# Patient Record
Sex: MALE | Race: WHITE | URBAN - METROPOLITAN AREA
[De-identification: names, ages, dates, MRNs, and addresses within clinical notes are randomized per-mention and may not be internally consistent; named-entity substitution may affect disease eponyms.]

---

## 2017-05-23 ENCOUNTER — HOSPITAL ENCOUNTER (OUTPATIENT)
Dept: LAB | Age: 67
Discharge: HOME | End: 2017-05-23

## 2017-05-23 PROCEDURE — 88305 TISSUE EXAM BY PATHOLOGIST: CPT | Performed by: INTERNAL MEDICINE

## 2022-05-18 ENCOUNTER — PREP FOR PROCEDURE (OUTPATIENT)
Dept: ORTHOPEDIC SURGERY | Age: 72
End: 2022-05-18

## 2022-05-24 ENCOUNTER — HOSPITAL ENCOUNTER (OUTPATIENT)
Dept: PREADMISSION TESTING | Age: 72
Discharge: HOME | End: 2022-05-27

## 2022-05-24 VITALS
DIASTOLIC BLOOD PRESSURE: 88 MMHG | BODY MASS INDEX: 27.44 KG/M2 | OXYGEN SATURATION: 94 % | SYSTOLIC BLOOD PRESSURE: 142 MMHG | WEIGHT: 196 LBS | TEMPERATURE: 97.5 F | RESPIRATION RATE: 17 BRPM | HEIGHT: 71 IN | HEART RATE: 83 BPM

## 2022-05-24 RX ORDER — IBUPROFEN 200 MG
200 TABLET ORAL EVERY 6 HOURS PRN
Status: ON HOLD | COMMUNITY
End: 2022-10-11 | Stop reason: HOSPADM

## 2022-05-24 RX ORDER — ASPIRIN 81 MG/1
81 TABLET ORAL DAILY
COMMUNITY

## 2022-05-24 RX ORDER — ROSUVASTATIN CALCIUM 20 MG/1
10 TABLET, COATED ORAL DAILY
COMMUNITY
Start: 2022-01-20

## 2022-05-24 RX ORDER — MAGNESIUM 200 MG
TABLET ORAL DAILY
COMMUNITY
End: 2022-10-10

## 2022-05-24 RX ORDER — MUPIROCIN CALCIUM 20 MG/G
CREAM TOPICAL 2 TIMES DAILY
Status: DISCONTINUED | OUTPATIENT
Start: 2022-05-24 | End: 2022-05-28 | Stop reason: HOSPADM

## 2022-05-24 ASSESSMENT — PAIN SCALES - GENERAL: PAINLEVEL_OUTOF10: 0

## 2022-05-24 NOTE — PRE-PROCEDURE INSTRUCTIONS
Patient verified name, , and surgery as listed in [facility]. Patient provided medical/health information and PTA medications to the best of their ability. TYPE  CASE: IB  Orders per surgeon: were received  Labs per surgeon: MRSA, lab unable to preform test.   Labs per anesthesia protocol: none. EKG:  None per protocol. Prescription for Mupirocin ointment 22g tube, apply intranasally to both nostrils BID x5 days pre-operatively or for a total of 10 doses; called to [facility]. Patient provided with and instructed on education handouts including Guide to Surgery, blood transfusions, pain management, and hand hygiene for the family and community, and [facility] brochure. Road to Recovery Spine surgery patient guide given. Instructed on incentive spirometry with return demonstration. Patient viewed spine prehab video. Hibiclens and instructions given per hospital policy. Original medication prescription bottles were not visualized during patient appointment. Patient teach back successful and patient demonstrates knowledge of instruction.

## 2022-05-24 NOTE — PRE-PROCEDURE INSTRUCTIONS
PLEASE CONTINUE TAKING ALL PRESCRIPTION MEDICATIONS UP TO THE DAY OF SURGERY UNLESS OTHERWISE DIRECTED BELOW. DISCONTINUE all vitamins and supplements 7 days prior to surgery. DISCONTINUE Non-Steriodal Anti-Inflammatory (NSAIDS) such as Advil and Aleve 5 days prior to surgery. Home Medications to take  the day of surgery    Rosuvastatin           Home Medications   to Hold              Comments   Stop your Aspirin 81 mg on May 26, 5 days prior to your surgery    On the day before surgery please take Acetaminophen 1000mg in the morning and then again before bed. You may substitute for Tylenol 650 mg. Mupirocin 2% ointment    Prevention of Infection after surgery is a goal of your care at [address] [place] [address]. Infection prevention is a team effort between you, your doctor, and the staff at [address] [place] [address]. We can help prevent infection after surgery by good hygiene, hand washing and use of mupirocin ointment. You will need to use Mupirocin ointment in your nose twice daily for five days, please carefully read the instructions below and follow ALL of them exactly. How to use Mupirocin ointment:   This medicine has been approved for use in the nose. You do not need to use it anywhere else.  Do not get any of it in your eyes or on your skin. If it does get on these areas, rinse it off right away.  Before using the medicine, gently blow your nose to clear the nostrils. Apply inside each nostril with a cotton tipped applicator.  This medicine is not for long term use.  Make sure your doctor knows if you are pregnant or breast feeding.  This medicine should not be used in children under 15years old, unless prescribed by your doctor. Mupirocin 2% ointment: Apply to each nostril, twice daily: once in the morning and once in the evening, for five days. If you have any questions, please call the Preassessment Department where you had your appointment.  Please call between the hours of 8:30 am and 4:30 pm, Monday through Friday, excluding Holidays. Leslie Fuller [phone], [address] [phone]. Please check off on the chart below each and every time that you use the ointment. All doses of the medication need to be to completed the night before your surgery date. Start date: ___5/26/2020__________     Day #     Date Morning dose Afternoon dose    One      Two      Three      Four      Five           It is important to bring this completed sheet to the hospital the day of surgery. Give it to the nurse who gets you ready for surgery.  If you forget to bring the sheet, the 10 doses will be started over   DO NOT bring the tube of Mupirocin with you. If you have not completed all 10 doses, it will be   given to you by your nurses in the hospital    Thank you! FREQUENTLY ASKED QUESTIONS:    What is Staph and MRSA? Staphylococcus aureus or Staph germs are very common. About 1 out of every 3 people (about 30%) carries Staph on their skin or in their nose. Methicillin-resistant Staphylococcus aureus or MRSA germs are a type of Staph germ that are very resistant to antibiotics. About 2 out of every 100 people (about 2%) carry MRSA on their skin or in their nose. Does this mean I have a Staph or MRSA infection in my nose? Being colonized or a carrier means you carry the germ but have no active signs or symptoms of infection. You are not sick with a MRSA or Staph infection. How did I get Staph or MRSA? Anyone can get Staph or MRSA on their body by either having contact with an infected wound or sharing personal items that have touched infected skin. People at higher risk for contacting MRSA or Staph are those in close contact with shared equipment or supplies. For example, athletes,  and school students, nursing home residents and those receiving inpatient medical care are at higher risk. Can my family or friends get Staph or MRSA from me?    The chance of family or friends getting Staph or MRSA from you is very low. Bathing frequently, frequent hand washing and not sharing personal items like towels or razors will help prevent spreading Staph or MRSA to others. Your family or friends should wash hands with soap and water or use antibacterial gel before and after visiting with you. As long as family and friends, including children, are healthyit is okay to visit with your loved ones. Will this cancel my surgery? No, being colonized with Staph or MRSA will not cancel your surgery. However, an infection can start if Staph or MRSA germs enter a break in the skin such as a surgical site. By using the Mupirocin ointment if your swab is positive, frequent hand washing and practicing good hygiene like bathing before your surgery, we can work together to help prevent infection after your surgery. Please do not bring home medications with you on the day of surgery unless otherwise directed by your nurse. If you are instructed to bring home medications, please give them to your nurse as they will be administered by the nursing staff. If you have any questions, please call [address] [place] [address] [phone] or [address] [place] [address] [phone]. A copy of this note was provided to the patient for reference.

## 2022-05-30 ENCOUNTER — ANESTHESIA EVENT (OUTPATIENT)
Dept: SURGERY | Age: 72
End: 2022-05-30
Payer: COMMERCIAL

## 2022-05-31 ENCOUNTER — ANESTHESIA (OUTPATIENT)
Dept: SURGERY | Age: 72
End: 2022-05-31
Payer: COMMERCIAL

## 2022-05-31 ENCOUNTER — HOSPITAL ENCOUNTER (OUTPATIENT)
Age: 72
Setting detail: OUTPATIENT SURGERY
Discharge: HOME | End: 2022-05-31
Attending: ORTHOPAEDIC SURGERY | Admitting: ORTHOPAEDIC SURGERY
Payer: COMMERCIAL

## 2022-05-31 ENCOUNTER — APPOINTMENT (OUTPATIENT)
Dept: GENERAL RADIOLOGY | Age: 72
End: 2022-05-31
Attending: ORTHOPAEDIC SURGERY
Payer: COMMERCIAL

## 2022-05-31 VITALS
HEIGHT: 71 IN | RESPIRATION RATE: 16 BRPM | DIASTOLIC BLOOD PRESSURE: 83 MMHG | BODY MASS INDEX: 26.94 KG/M2 | TEMPERATURE: 97.7 F | SYSTOLIC BLOOD PRESSURE: 153 MMHG | HEART RATE: 63 BPM | WEIGHT: 192.4 LBS | OXYGEN SATURATION: 97 %

## 2022-05-31 DIAGNOSIS — M48.062 NEUROGENIC CLAUDICATION DUE TO LUMBAR SPINAL STENOSIS: Primary | ICD-10-CM

## 2022-05-31 PROBLEM — M71.38 SYNOVIAL CYST OF LUMBAR FACET JOINT: Status: ACTIVE | Noted: 2022-05-31

## 2022-05-31 PROCEDURE — 3700000000 HC ANESTHESIA ATTENDED CARE: Performed by: ORTHOPAEDIC SURGERY

## 2022-05-31 PROCEDURE — 2709999900 HC NON-CHARGEABLE SUPPLY: Performed by: ORTHOPAEDIC SURGERY

## 2022-05-31 PROCEDURE — 63047 LAM FACETEC & FORAMOT LUMBAR: CPT | Performed by: ORTHOPAEDIC SURGERY

## 2022-05-31 PROCEDURE — 6360000002 HC RX W HCPCS: Performed by: NURSE ANESTHETIST, CERTIFIED REGISTERED

## 2022-05-31 PROCEDURE — 2720000010 HC SURG SUPPLY STERILE: Performed by: ORTHOPAEDIC SURGERY

## 2022-05-31 PROCEDURE — 6370000000 HC RX 637 (ALT 250 FOR IP): Performed by: ANESTHESIOLOGY

## 2022-05-31 PROCEDURE — 3700000001 HC ADD 15 MINUTES (ANESTHESIA): Performed by: ORTHOPAEDIC SURGERY

## 2022-05-31 PROCEDURE — 6360000002 HC RX W HCPCS: Performed by: ORTHOPAEDIC SURGERY

## 2022-05-31 PROCEDURE — 3600000014 HC SURGERY LEVEL 4 ADDTL 15MIN: Performed by: ORTHOPAEDIC SURGERY

## 2022-05-31 PROCEDURE — 2580000003 HC RX 258: Performed by: ANESTHESIOLOGY

## 2022-05-31 PROCEDURE — 7100000001 HC PACU RECOVERY - ADDTL 15 MIN: Performed by: ORTHOPAEDIC SURGERY

## 2022-05-31 PROCEDURE — A4217 STERILE WATER/SALINE, 500 ML: HCPCS | Performed by: ORTHOPAEDIC SURGERY

## 2022-05-31 PROCEDURE — 6370000000 HC RX 637 (ALT 250 FOR IP): Performed by: ORTHOPAEDIC SURGERY

## 2022-05-31 PROCEDURE — 7100000010 HC PHASE II RECOVERY - FIRST 15 MIN: Performed by: ORTHOPAEDIC SURGERY

## 2022-05-31 PROCEDURE — 2500000003 HC RX 250 WO HCPCS: Performed by: NURSE ANESTHETIST, CERTIFIED REGISTERED

## 2022-05-31 PROCEDURE — 2500000003 HC RX 250 WO HCPCS

## 2022-05-31 PROCEDURE — 2580000003 HC RX 258: Performed by: ORTHOPAEDIC SURGERY

## 2022-05-31 PROCEDURE — 7100000000 HC PACU RECOVERY - FIRST 15 MIN: Performed by: ORTHOPAEDIC SURGERY

## 2022-05-31 PROCEDURE — 7100000011 HC PHASE II RECOVERY - ADDTL 15 MIN: Performed by: ORTHOPAEDIC SURGERY

## 2022-05-31 PROCEDURE — 3600000004 HC SURGERY LEVEL 4 BASE: Performed by: ORTHOPAEDIC SURGERY

## 2022-05-31 PROCEDURE — 2500000003 HC RX 250 WO HCPCS: Performed by: ORTHOPAEDIC SURGERY

## 2022-05-31 PROCEDURE — 63048 LAM FACETEC &FORAMOT EA ADDL: CPT | Performed by: ORTHOPAEDIC SURGERY

## 2022-05-31 RX ORDER — MIDAZOLAM HYDROCHLORIDE 2 MG/2ML
2 INJECTION, SOLUTION INTRAMUSCULAR; INTRAVENOUS
Status: DISCONTINUED | OUTPATIENT
Start: 2022-05-31 | End: 2022-05-31 | Stop reason: HOSPADM

## 2022-05-31 RX ORDER — BUPIVACAINE HYDROCHLORIDE AND EPINEPHRINE 5; 5 MG/ML; UG/ML
INJECTION, SOLUTION EPIDURAL; INTRACAUDAL; PERINEURAL PRN
Status: DISCONTINUED | OUTPATIENT
Start: 2022-05-31 | End: 2022-05-31 | Stop reason: ALTCHOICE

## 2022-05-31 RX ORDER — PROPOFOL 10 MG/ML
INJECTION, EMULSION INTRAVENOUS PRN
Status: DISCONTINUED | OUTPATIENT
Start: 2022-05-31 | End: 2022-05-31 | Stop reason: SDUPTHER

## 2022-05-31 RX ORDER — GLYCOPYRROLATE 0.2 MG/ML
INJECTION INTRAMUSCULAR; INTRAVENOUS PRN
Status: DISCONTINUED | OUTPATIENT
Start: 2022-05-31 | End: 2022-05-31 | Stop reason: SDUPTHER

## 2022-05-31 RX ORDER — DIPHENHYDRAMINE HYDROCHLORIDE 50 MG/ML
12.5 INJECTION INTRAMUSCULAR; INTRAVENOUS
Status: DISCONTINUED | OUTPATIENT
Start: 2022-05-31 | End: 2022-05-31 | Stop reason: HOSPADM

## 2022-05-31 RX ORDER — PROCHLORPERAZINE EDISYLATE 5 MG/ML
5 INJECTION INTRAMUSCULAR; INTRAVENOUS
Status: DISCONTINUED | OUTPATIENT
Start: 2022-05-31 | End: 2022-05-31 | Stop reason: HOSPADM

## 2022-05-31 RX ORDER — FENTANYL CITRATE 50 UG/ML
INJECTION, SOLUTION INTRAMUSCULAR; INTRAVENOUS PRN
Status: DISCONTINUED | OUTPATIENT
Start: 2022-05-31 | End: 2022-05-31 | Stop reason: SDUPTHER

## 2022-05-31 RX ORDER — ROCURONIUM BROMIDE 10 MG/ML
INJECTION, SOLUTION INTRAVENOUS PRN
Status: DISCONTINUED | OUTPATIENT
Start: 2022-05-31 | End: 2022-05-31 | Stop reason: SDUPTHER

## 2022-05-31 RX ORDER — SODIUM CHLORIDE, SODIUM LACTATE, POTASSIUM CHLORIDE, CALCIUM CHLORIDE 600; 310; 30; 20 MG/100ML; MG/100ML; MG/100ML; MG/100ML
INJECTION, SOLUTION INTRAVENOUS CONTINUOUS
Status: DISCONTINUED | OUTPATIENT
Start: 2022-05-31 | End: 2022-05-31 | Stop reason: HOSPADM

## 2022-05-31 RX ORDER — DEXAMETHASONE SODIUM PHOSPHATE 4 MG/ML
INJECTION, SOLUTION INTRA-ARTICULAR; INTRALESIONAL; INTRAMUSCULAR; INTRAVENOUS; SOFT TISSUE PRN
Status: DISCONTINUED | OUTPATIENT
Start: 2022-05-31 | End: 2022-05-31 | Stop reason: SDUPTHER

## 2022-05-31 RX ORDER — HYDROMORPHONE HYDROCHLORIDE 2 MG/ML
0.25 INJECTION, SOLUTION INTRAMUSCULAR; INTRAVENOUS; SUBCUTANEOUS EVERY 5 MIN PRN
Status: DISCONTINUED | OUTPATIENT
Start: 2022-05-31 | End: 2022-05-31 | Stop reason: HOSPADM

## 2022-05-31 RX ORDER — NEOSTIGMINE METHYLSULFATE 1 MG/ML
INJECTION, SOLUTION INTRAVENOUS PRN
Status: DISCONTINUED | OUTPATIENT
Start: 2022-05-31 | End: 2022-05-31 | Stop reason: SDUPTHER

## 2022-05-31 RX ORDER — ACETAMINOPHEN 500 MG
1000 TABLET ORAL ONCE
Status: COMPLETED | OUTPATIENT
Start: 2022-05-31 | End: 2022-05-31

## 2022-05-31 RX ORDER — ONDANSETRON 2 MG/ML
INJECTION INTRAMUSCULAR; INTRAVENOUS PRN
Status: DISCONTINUED | OUTPATIENT
Start: 2022-05-31 | End: 2022-05-31 | Stop reason: SDUPTHER

## 2022-05-31 RX ORDER — ONDANSETRON 2 MG/ML
4 INJECTION INTRAMUSCULAR; INTRAVENOUS
Status: DISCONTINUED | OUTPATIENT
Start: 2022-05-31 | End: 2022-05-31 | Stop reason: HOSPADM

## 2022-05-31 RX ORDER — OXYCODONE HYDROCHLORIDE 5 MG/1
5 TABLET ORAL EVERY 6 HOURS PRN
Qty: 20 TABLET | Refills: 0 | Status: SHIPPED | OUTPATIENT
Start: 2022-05-31 | End: 2022-06-05

## 2022-05-31 RX ORDER — LIDOCAINE HYDROCHLORIDE 20 MG/ML
INJECTION, SOLUTION EPIDURAL; INFILTRATION; INTRACAUDAL; PERINEURAL PRN
Status: DISCONTINUED | OUTPATIENT
Start: 2022-05-31 | End: 2022-05-31 | Stop reason: SDUPTHER

## 2022-05-31 RX ORDER — HYDROMORPHONE HYDROCHLORIDE 2 MG/ML
0.5 INJECTION, SOLUTION INTRAMUSCULAR; INTRAVENOUS; SUBCUTANEOUS EVERY 5 MIN PRN
Status: DISCONTINUED | OUTPATIENT
Start: 2022-05-31 | End: 2022-05-31 | Stop reason: HOSPADM

## 2022-05-31 RX ADMIN — GLYCOPYRROLATE 0.4 MG: 0.2 INJECTION INTRAMUSCULAR; INTRAVENOUS at 09:05

## 2022-05-31 RX ADMIN — SODIUM CHLORIDE, SODIUM LACTATE, POTASSIUM CHLORIDE, AND CALCIUM CHLORIDE: 600; 310; 30; 20 INJECTION, SOLUTION INTRAVENOUS at 06:49

## 2022-05-31 RX ADMIN — ACETAMINOPHEN 1000 MG: 500 TABLET ORAL at 06:39

## 2022-05-31 RX ADMIN — PROPOFOL 200 MG: 10 INJECTION, EMULSION INTRAVENOUS at 08:18

## 2022-05-31 RX ADMIN — ONDANSETRON 4 MG: 2 INJECTION INTRAMUSCULAR; INTRAVENOUS at 08:33

## 2022-05-31 RX ADMIN — Medication 3 MG: at 09:05

## 2022-05-31 RX ADMIN — DEXAMETHASONE SODIUM PHOSPHATE 4 MG: 4 INJECTION, SOLUTION INTRAMUSCULAR; INTRAVENOUS at 08:33

## 2022-05-31 RX ADMIN — FENTANYL CITRATE 50 MCG: 50 INJECTION INTRAMUSCULAR; INTRAVENOUS at 08:14

## 2022-05-31 RX ADMIN — Medication 2000 MG: at 08:26

## 2022-05-31 RX ADMIN — LIDOCAINE HYDROCHLORIDE 100 MG: 20 INJECTION, SOLUTION EPIDURAL; INFILTRATION; INTRACAUDAL; PERINEURAL at 08:18

## 2022-05-31 RX ADMIN — ROCURONIUM BROMIDE 40 MG: 50 INJECTION, SOLUTION INTRAVENOUS at 08:18

## 2022-05-31 RX ADMIN — Medication 3 AMPULE: at 06:38

## 2022-05-31 ASSESSMENT — PAIN - FUNCTIONAL ASSESSMENT
PAIN_FUNCTIONAL_ASSESSMENT: 0-10
PAIN_FUNCTIONAL_ASSESSMENT: 0-10
PAIN_FUNCTIONAL_ASSESSMENT: ACTIVITIES ARE NOT PREVENTED

## 2022-05-31 ASSESSMENT — PAIN SCALES - GENERAL: PAINLEVEL_OUTOF10: 0

## 2022-05-31 ASSESSMENT — PAIN DESCRIPTION - DESCRIPTORS: DESCRIPTORS: DISCOMFORT

## 2022-05-31 NOTE — ANESTHESIA PRE PROCEDURE
Department of Anesthesiology  Preprocedure Note       Name:  Ze Fuchs   Age:  73 y.o.  :  1950                                          MRN:  [id number]         Date:  2022      Surgeon: Selena Wood):  Evelyne Reynaga MD    Procedure: Procedure(s):  LEFT L4 L5 JOAQUIN LAMINECTOMY    Medications prior to admission:   Prior to Admission medications    Medication Sig Start Date End Date Taking? Authorizing Provider   rosuvastatin (CRESTOR) 20 MG tablet Take 10 mg by mouth daily  22   Historical Provider, MD   aspirin 81 MG EC tablet Take 81 mg by mouth daily    Historical Provider, MD   magnesium 200 MG TABS tablet Take by mouth daily     Historical Provider, MD   ibuprofen (ADVIL;MOTRIN) 200 MG tablet Take 200 mg by mouth every 6 hours as needed for Pain  Patient not taking: Reported on 2022    Historical Provider, MD       Current medications:    Current Facility-Administered Medications   Medication Dose Route Frequency Provider Last Rate Last Admin    lidocaine 1 % injection 1 mL  1 mL IntraDERmal Once PRN Adrienne Braden IV, MD        midazolam PF (VERSED) injection 2 mg  2 mg IntraVENous Once PRN Adrienne Braden IV, MD        ceFAZolin (ANCEF) 2000 mg in sterile water 20 mL IV syringe  2,000 mg IntraVENous On Call to Dian Rodarte MD        lactated ringers infusion   IntraVENous Continuous Adrienne Braden IV,  mL/hr at 22 0649 New Bag at 22 0302       Allergies: Allergies   Allergen Reactions    Other Itching     \"opiods\"       Problem List:  There is no problem list on file for this patient.       Past Medical History:        Diagnosis Date    Arthritis     Hyperlipidemia     Osteoarthritis        Past Surgical History:        Procedure Laterality Date    COLONOSCOPY      ELBOW ARTHROSCOPY Right     FINGER SURGERY Left     thumb    SHOULDER ARTHROSCOPY Left     UVULECTOMY         Social History:    Social History     Tobacco Use caps      Pulmonary: breath sounds clear to auscultation                            ROS comment: H/o UPPP, denies apnea now   Cardiovascular:  Exercise tolerance: Limited by lower back pain but still walks          Rhythm: regular  Rate: normal                    Neuro/Psych:                ROS comment: Left sided neurogenic claudication pain GI/Hepatic/Renal: Neg GI/Hepatic/Renal ROS            Endo/Other: Negative Endo/Other ROS                    Abdominal:             Vascular: negative vascular ROS. Other Findings:           Anesthesia Plan      general     ASA 2       Induction: intravenous. MIPS: Postoperative opioids intended and Prophylactic antiemetics administered. Anesthetic plan and risks discussed with patient.         Attending anesthesiologist reviewed and agrees with Preprocedure content                Jessica Gross MD   5/31/2022

## 2022-05-31 NOTE — ANESTHESIA POSTPROCEDURE EVALUATION
Department of Anesthesiology  Postprocedure Note    Patient: Ze Fuchs  MRN: [id number]  YOB: 1950  Date of evaluation: 5/31/2022  Time:  5:14 PM     Procedure Summary     Date: 05/31/22 Room / Location: [facility] MAIN OR  / [facility] MAIN OR    Anesthesia Start: 0811 Anesthesia Stop: 1378    Procedure: LEFT L4 L5 JOAQUIN LAMINECTOMY (Left Spine Lumbar) Diagnosis:       Synovial cyst of lumbar facet joint      Neurogenic claudication due to lumbar spinal stenosis      (Synovial cyst of lumbar facet joint [M71.38])      (Neurogenic claudication due to lumbar spinal stenosis [W90.507])    Providers: Evelyne Reynaga MD Responsible Provider: Debbie Perez MD    Anesthesia Type: general ASA Status: 2          Anesthesia Type: No value filed. Amy Phase I: Amy Score: 8    Amy Phase II: Amy Score: 10    Last vitals: Reviewed and per EMR flowsheets.        Anesthesia Post Evaluation    Patient location during evaluation: PACU  Patient participation: complete - patient participated  Level of consciousness: awake  Airway patency: patent  Nausea & Vomiting: no nausea and no vomiting  Complications: no  Cardiovascular status: blood pressure returned to baseline  Respiratory status: acceptable  Hydration status: euvolemic

## 2022-05-31 NOTE — OP NOTE
[address] [address]   [phone]    OPERATIVE REPORT    Patient Marce Murguia  [id number]  1950  73 y.o. DATE OF SURGERY: 5/31/2022    SURGEON: Dr. Traore Other DIAGNOSIS:left  L4 - L5 lateral recess stenosis and synovial cyst.    POSTOPERATIVE DIAGNOSIS: left L4 - L5 lateral recess stenosis and synovial cyst.    PROCEDURE:     1. left L4 and L5 hemilaminectomy including excision of synovial cyst. (CPT 21816, 08399)      ANESTHESIA: General.    ESTIMATED BLOOD LOSS: 10 cc    POSTOPERATIVE CONDITION: Stable. INTRAOPERATIVE COMPLICATIONS: None. INDICATION FOR PROCEDURE: The patient has a history of low back pain with episodic radiation to the right lower extremity consistent with neurogenic claudication primarily affecting the L5 nerve root. The patient has failed an extended period of observation and conservative measures. In the outpatient setting, the risks, benefits, and potential complications of the procedure were discussed and an informed consent was obtained. DESCRIPTION OF PROCEDURE: After adequate induction of general anesthesia, the patient was positioned prone on the [facility] spinal table. Care was taken to pad all bony prominences. Shoulders and elbows were placed in a 90-90 position. The abdomen was allowed to hang free to decrease intraabdominal pressure. The legs were flexed using the sling. The lumbar area was prepped and draped in the usual sterile fashion using a DuraPrep scrub. Preoperative antibiotics were administered. A time-out was called to confirm appropriate patient, proposed procedure, and proposed incision site. With this confirmation, an incision was created over the appropriate interspace. Dissection was carried down to the lumbodorsal fascia. The lumbodorsal fascia was released on the left side and dissection was carried down to the lamina and pars interarticularis.  A 000 curette was used to elevate the ligamentum flavum at its origin on the caudal surface of the L4  lamina and a Penfield number four was slipped just anterior to the lamina. C-arm fluoroscopy was brought in and used to obtain a cross table fluoroscopic image to confirm appropriate spinal level. With this confirmation, the Jacky Pappas number four was removed and the area was marked with electrocautery. The operating microscope was brought to the surgical field. The ligamentum flavum was then elevated off of its insertion on the cephalad surface of the  L5  lamina. A 4 mm Kerrison was used to perform a hemilaminectomy of L4 as well as L5. The ligamentum flavum was carefully elevated off of the thecal sac and excised with the Kerrison rongeur. The descending nerve root was identified and retracted medially. The lateral recess was undercut laterally to the pedicle. A foraminotomy was performed to decompress the exiting L4 and L5 nerve root. The nerve root was retracted medially again with the J Luis nerve root retractor. A cyst was noted and a excised. There was no disc herniation. The nerve was released from retraction and the area inspected and palpated with a nerve hook for adequacy of decompression. This was satisfactory. The lateral rescess was flushed with saline solution. The dorsal wound was flushed as well. The lumbodorsal fascia was approximated with a number one Vicryl suture in interrupted fashion. The skin and subcutaneous tissue were then closed in a layered fashion. Marcaine was infiltrated subcutaneously. Dermabond was applied. The patient tolerated the procedure well and was returned to the postanesthesia care unit in stable condition. At the end of the case, all sponge, needle, and instrument counts were correct. Signed: Lacie Allen.  Nahid Joseph MD

## 2022-06-02 ENCOUNTER — TELEPHONE (OUTPATIENT)
Dept: ORTHOPEDIC SURGERY | Age: 72
End: 2022-06-02

## 2022-06-02 DIAGNOSIS — Z98.890 STATUS POST LUMBAR SURGERY: Primary | ICD-10-CM

## 2022-06-02 RX ORDER — METHYLPREDNISOLONE 4 MG/1
TABLET ORAL
Qty: 1 KIT | Refills: 0 | Status: SHIPPED | OUTPATIENT
Start: 2022-06-02 | End: 2022-06-08

## 2022-06-02 NOTE — TELEPHONE ENCOUNTER
Discussed with Dr. Larry Banuelos and instructed to send a medrol dos pk to patient's pharmacy. Spoke with patient.  He is aware

## 2022-06-16 ENCOUNTER — OFFICE VISIT (OUTPATIENT)
Dept: ORTHOPEDIC SURGERY | Age: 72
End: 2022-06-16

## 2022-06-16 DIAGNOSIS — Z98.890 STATUS POST LUMBAR SURGERY: Primary | ICD-10-CM

## 2022-06-16 PROCEDURE — 99024 POSTOP FOLLOW-UP VISIT: CPT | Performed by: ORTHOPAEDIC SURGERY

## 2022-06-16 NOTE — PROGRESS NOTES
Name: Seven Mcmanus  YOB: 1950  Gender: male  MRN: [id number]  Age: 73 y.o. Chief Complaint: Lumbar spine surgery follow up    History of Present Illness:      Seven Mcmanus  is here for 2-week follow up of his  hemilaminectomy surgery. He reports some relief of preoperative lower extremity pain, weakness and parasthesias. Initially after surgery he did have significant radicular symptoms. Those subsided with a regimen of oral steroids. Now, he mostly has a tightness in his left hip. There is the expected residual back stiffness. Medications:       Current Outpatient Medications:     rosuvastatin (CRESTOR) 20 MG tablet, Take 10 mg by mouth daily , Disp: , Rfl:     aspirin 81 MG EC tablet, Take 81 mg by mouth daily, Disp: , Rfl:     magnesium 200 MG TABS tablet, Take by mouth daily , Disp: , Rfl:     ibuprofen (ADVIL;MOTRIN) 200 MG tablet, Take 200 mg by mouth every 6 hours as needed for Pain (Patient not taking: Reported on 5/31/2022), Disp: , Rfl:     Allergies: Allergies   Allergen Reactions    Other Itching     \"opiods\"         Physical Exam:      Respirations are unlabored and there is no evidence of cyanosis      Wound is healing nicely without erythema, drainage or underlying fluctuance    Gait is With a cane    Sensory testing reveals intact sensation to light touch and in the distribution of the L3-S1 dermatomes bilaterally. Strength testing in the lower extremity reveals the following based on the 5 point grading scale:       HF (L2) H Ab (L5) KE (L3/4) ADF (L4) EHL (L5) A Ev (S1) APF (S1)   Right 5 5 5 5 5 5 5   Left 5 5 5 5 5 5 5       Diagnosis:      ICD-10-CM    1. Status post lumbar surgery  Z98.890        Assessment/Plan: This patient is following the expected course following the spine surgery. I encouraged him to walk as much as possible for exercise. He can also begin a stationary bike, or other low impact aerobic exercise.  We will limit lifting to 10 pounds for the next several weeks. Prolonged sitting should be avoided to minimize the strain on the lumbar area. Thereafter, activities will be gradually increased as tolerated. I will have him return for follow up in 4 weeks or as needed if better.           Renetta Mccarthy MD    06/16/22  8:13 AM

## 2022-09-19 ENCOUNTER — TELEPHONE (OUTPATIENT)
Dept: ORTHOPEDIC SURGERY | Age: 72
End: 2022-09-19

## 2022-09-19 ENCOUNTER — OFFICE VISIT (OUTPATIENT)
Dept: ORTHOPEDIC SURGERY | Age: 72
End: 2022-09-19
Payer: COMMERCIAL

## 2022-09-19 DIAGNOSIS — M79.672 LEFT FOOT PAIN: Primary | ICD-10-CM

## 2022-09-19 DIAGNOSIS — M20.12 HALLUX VALGUS OF LEFT FOOT: ICD-10-CM

## 2022-09-19 PROCEDURE — 1123F ACP DISCUSS/DSCN MKR DOCD: CPT | Performed by: ORTHOPAEDIC SURGERY

## 2022-09-19 PROCEDURE — 99214 OFFICE O/P EST MOD 30 MIN: CPT | Performed by: ORTHOPAEDIC SURGERY

## 2022-09-19 NOTE — PROGRESS NOTES
Name: Filippo Suarez  YOB: 1950  Gender: male  MRN: [id number]    Summary:   Left hallux valgus       CC: New Patient (Left foot  x-rays taken in office today)       HPI: Filippo Suarez is a 73 y.o. male who presents with New Patient (Left foot  x-rays taken in office today)  . This patient presents to the office with a longstanding history of left bunion pain which is gotten worse. History was obtained by Patient     ROS/Meds/PSH/PMH/FH/SH: I personally reviewed the patients standard intake form. Below are the pertinents    Tobacco:  reports that he has quit smoking. He has never used smokeless tobacco.  Diabetes: None      Physical Examination:  Exam of the left foot and shows a hallux valgus deformity. There are no lesser toe deformities or metatarsalgia. There is no evidence of TMT instability. He has palpable pulses and intact sensation. Imaging:   I independently interpreted XR taken today  Left foot XR: AP, Lateral, Oblique views     ICD-10-CM    1. Left foot pain  M79.672 XR FOOT LEFT (MIN 3 VIEWS)      2. Hallux valgus of left foot  M20.12          Report: AP, lateral, oblique x-ray of the left foot demonstrates hallux valgus    Impression: Hallux valgus   Crystal Kwon III, MD           Assessment:   Left hallux valgus    Treatment Plan:   4 This is a chronic illness/condition with exacerbation and progression  Treatment at this time: Elective major surgery with procedural risk factors  Studies ordered: NO XR needed @ Next Visit    Weight-bearing status: WBAT        Return to work/work restrictions: none  No medications given      Left double level minimal invasive bunionectomy    Outpatient - 1-1/4 hours, c-arm, , Reed bur and screws    Anesthesia -  Choice       The patient understands the diagnosis of bunions and claw toes, conservative and surgical treatment.   R/C outlined including the risk of recurrence, risk of non-healing of skin and bone with/without infection,  potential loss/amputation of a toe(s) secondary to circulation loss, the risk of stiffness in the toes, and the overall risk of swelling that could be prolonged. The patient understands the use of internal and/or external k-wire type fixation and that it may take 6 months to a year before the patient can comfortably get back into regular/dress shoes. Generalized surgical risks and complications were discussed. The patient accepts and would like to proceed with surgery.

## 2022-09-20 DIAGNOSIS — M20.12 HALLUX VALGUS OF LEFT FOOT: Primary | ICD-10-CM

## 2022-10-10 ENCOUNTER — ANESTHESIA EVENT (OUTPATIENT)
Dept: SURGERY | Age: 72
End: 2022-10-10
Payer: COMMERCIAL

## 2022-10-10 NOTE — PERIOP NOTE
Patient verified name and . Order for consent  found in EHR and matches case posting; patient verifies procedure. Type 1B surgery, phone assessment complete. Orders not received. Labs per surgeon: None  Labs per anesthesia protocol: NA    Patient answered medical/surgical history questions at their best of ability. All prior to admission medications documented in [facility]. Patient instructed to take the following medications the day of surgery according to anesthesia guidelines with a small sip of water: Crestor  On the day before surgery please take Acetaminophen 1000mg in the morning and then again before bed. You may substitute for Tylenol 650 mg. Hold all vitamins 7 days prior to surgery and NSAIDS 5 days prior to surgery. Prescription meds to hold:NA  Patient instructed on the following:    > Arrive at [facility], time of arrival to be called the day before by 1700  > NPO after midnight, unless otherwise indicated, including gum, mints, and ice chips  > Responsible adult must drive patient to the hospital, stay during surgery, and patient will need supervision 24 hours after anesthesia  > Use antibacterial soap in shower the night before surgery and on the morning of surgery  > All piercings must be removed prior to arrival.    > Leave all valuables (money and jewelry) at home but bring insurance card and ID on DOS.   > You may be required to pay a deductible or co-pay on the day of your procedure. You can pre-pay by calling [phone] if your surgery is at the [facility] [place] or [phone] if your surgery is at the [facility]. > Do not wear make-up, nail polish, lotions, cologne, perfumes, powders, or oil on skin. Artificial nails are not permitted.

## 2022-10-11 ENCOUNTER — ANESTHESIA (OUTPATIENT)
Dept: SURGERY | Age: 72
End: 2022-10-11
Payer: COMMERCIAL

## 2022-10-11 ENCOUNTER — APPOINTMENT (OUTPATIENT)
Dept: GENERAL RADIOLOGY | Age: 72
End: 2022-10-11
Attending: ORTHOPAEDIC SURGERY
Payer: COMMERCIAL

## 2022-10-11 ENCOUNTER — HOSPITAL ENCOUNTER (OUTPATIENT)
Age: 72
Setting detail: OUTPATIENT SURGERY
Discharge: HOME | End: 2022-10-11
Attending: ORTHOPAEDIC SURGERY | Admitting: ORTHOPAEDIC SURGERY
Payer: COMMERCIAL

## 2022-10-11 VITALS
HEIGHT: 71 IN | TEMPERATURE: 98 F | OXYGEN SATURATION: 97 % | SYSTOLIC BLOOD PRESSURE: 134 MMHG | BODY MASS INDEX: 27.3 KG/M2 | DIASTOLIC BLOOD PRESSURE: 85 MMHG | RESPIRATION RATE: 15 BRPM | HEART RATE: 71 BPM | WEIGHT: 195 LBS

## 2022-10-11 DIAGNOSIS — G89.18 ACUTE POST-OPERATIVE PAIN: Primary | ICD-10-CM

## 2022-10-11 DIAGNOSIS — M20.12 HALLUX VALGUS, LEFT: ICD-10-CM

## 2022-10-11 PROCEDURE — 3600000014 HC SURGERY LEVEL 4 ADDTL 15MIN: Performed by: ORTHOPAEDIC SURGERY

## 2022-10-11 PROCEDURE — 2500000003 HC RX 250 WO HCPCS: Performed by: NURSE ANESTHETIST, CERTIFIED REGISTERED

## 2022-10-11 PROCEDURE — 7100000010 HC PHASE II RECOVERY - FIRST 15 MIN: Performed by: ORTHOPAEDIC SURGERY

## 2022-10-11 PROCEDURE — 3700000000 HC ANESTHESIA ATTENDED CARE: Performed by: ORTHOPAEDIC SURGERY

## 2022-10-11 PROCEDURE — 6360000002 HC RX W HCPCS: Performed by: NURSE PRACTITIONER

## 2022-10-11 PROCEDURE — 2500000003 HC RX 250 WO HCPCS: Performed by: ORTHOPAEDIC SURGERY

## 2022-10-11 PROCEDURE — 2709999900 HC NON-CHARGEABLE SUPPLY: Performed by: ORTHOPAEDIC SURGERY

## 2022-10-11 PROCEDURE — 2580000003 HC RX 258: Performed by: ANESTHESIOLOGY

## 2022-10-11 PROCEDURE — 6360000002 HC RX W HCPCS: Performed by: NURSE ANESTHETIST, CERTIFIED REGISTERED

## 2022-10-11 PROCEDURE — 28299 COR HLX VLGS DOUBLE OSTEOT: CPT | Performed by: ORTHOPAEDIC SURGERY

## 2022-10-11 PROCEDURE — 3600000004 HC SURGERY LEVEL 4 BASE: Performed by: ORTHOPAEDIC SURGERY

## 2022-10-11 PROCEDURE — 7100000000 HC PACU RECOVERY - FIRST 15 MIN: Performed by: ORTHOPAEDIC SURGERY

## 2022-10-11 PROCEDURE — 6370000000 HC RX 637 (ALT 250 FOR IP): Performed by: ANESTHESIOLOGY

## 2022-10-11 PROCEDURE — 3700000001 HC ADD 15 MINUTES (ANESTHESIA): Performed by: ORTHOPAEDIC SURGERY

## 2022-10-11 PROCEDURE — C1713 ANCHOR/SCREW BN/BN,TIS/BN: HCPCS | Performed by: ORTHOPAEDIC SURGERY

## 2022-10-11 PROCEDURE — 7100000001 HC PACU RECOVERY - ADDTL 15 MIN: Performed by: ORTHOPAEDIC SURGERY

## 2022-10-11 PROCEDURE — 2720000010 HC SURG SUPPLY STERILE: Performed by: ORTHOPAEDIC SURGERY

## 2022-10-11 DEVICE — SCREW
Type: IMPLANTABLE DEVICE | Site: FOOT | Status: FUNCTIONAL
Brand: HV SCREW SYSTEM

## 2022-10-11 DEVICE — SCREW BNE L60MM DIA4MM PROSTEP MICA: Type: IMPLANTABLE DEVICE | Site: FOOT | Status: FUNCTIONAL

## 2022-10-11 DEVICE — SCREW BNE L50MM DIA4MM TI ALLY CANN FULL THRD ST HDLSS: Type: IMPLANTABLE DEVICE | Site: FOOT | Status: FUNCTIONAL

## 2022-10-11 RX ORDER — PROPOFOL 10 MG/ML
INJECTION, EMULSION INTRAVENOUS PRN
Status: DISCONTINUED | OUTPATIENT
Start: 2022-10-11 | End: 2022-10-11 | Stop reason: SDUPTHER

## 2022-10-11 RX ORDER — ONDANSETRON 2 MG/ML
4 INJECTION INTRAMUSCULAR; INTRAVENOUS
Status: DISCONTINUED | OUTPATIENT
Start: 2022-10-11 | End: 2022-10-11 | Stop reason: HOSPADM

## 2022-10-11 RX ORDER — SODIUM CHLORIDE 0.9 % (FLUSH) 0.9 %
5-40 SYRINGE (ML) INJECTION EVERY 12 HOURS SCHEDULED
Status: DISCONTINUED | OUTPATIENT
Start: 2022-10-11 | End: 2022-10-11 | Stop reason: HOSPADM

## 2022-10-11 RX ORDER — DEXTROSE MONOHYDRATE 100 MG/ML
INJECTION, SOLUTION INTRAVENOUS CONTINUOUS PRN
Status: DISCONTINUED | OUTPATIENT
Start: 2022-10-11 | End: 2022-10-11 | Stop reason: HOSPADM

## 2022-10-11 RX ORDER — SODIUM CHLORIDE 0.9 % (FLUSH) 0.9 %
5-40 SYRINGE (ML) INJECTION PRN
Status: DISCONTINUED | OUTPATIENT
Start: 2022-10-11 | End: 2022-10-11 | Stop reason: HOSPADM

## 2022-10-11 RX ORDER — DIPHENHYDRAMINE HYDROCHLORIDE 50 MG/ML
12.5 INJECTION INTRAMUSCULAR; INTRAVENOUS
Status: DISCONTINUED | OUTPATIENT
Start: 2022-10-11 | End: 2022-10-11 | Stop reason: HOSPADM

## 2022-10-11 RX ORDER — DIPHENHYDRAMINE HCL 25 MG
25 TABLET ORAL EVERY 6 HOURS PRN
Qty: 30 TABLET | Refills: 0 | Status: SHIPPED | OUTPATIENT
Start: 2022-10-11 | End: 2022-11-10

## 2022-10-11 RX ORDER — SODIUM CHLORIDE, SODIUM LACTATE, POTASSIUM CHLORIDE, CALCIUM CHLORIDE 600; 310; 30; 20 MG/100ML; MG/100ML; MG/100ML; MG/100ML
INJECTION, SOLUTION INTRAVENOUS CONTINUOUS
Status: DISCONTINUED | OUTPATIENT
Start: 2022-10-11 | End: 2022-10-11 | Stop reason: HOSPADM

## 2022-10-11 RX ORDER — ACETAMINOPHEN 500 MG
1000 TABLET ORAL ONCE
Status: COMPLETED | OUTPATIENT
Start: 2022-10-11 | End: 2022-10-11

## 2022-10-11 RX ORDER — OXYCODONE HYDROCHLORIDE 5 MG/1
5 TABLET ORAL
Status: DISCONTINUED | OUTPATIENT
Start: 2022-10-11 | End: 2022-10-11 | Stop reason: HOSPADM

## 2022-10-11 RX ORDER — ONDANSETRON 2 MG/ML
INJECTION INTRAMUSCULAR; INTRAVENOUS PRN
Status: DISCONTINUED | OUTPATIENT
Start: 2022-10-11 | End: 2022-10-11 | Stop reason: SDUPTHER

## 2022-10-11 RX ORDER — LIDOCAINE HYDROCHLORIDE 20 MG/ML
INJECTION, SOLUTION EPIDURAL; INFILTRATION; INTRACAUDAL; PERINEURAL PRN
Status: DISCONTINUED | OUTPATIENT
Start: 2022-10-11 | End: 2022-10-11 | Stop reason: SDUPTHER

## 2022-10-11 RX ORDER — DEXAMETHASONE SODIUM PHOSPHATE 4 MG/ML
INJECTION, SOLUTION INTRA-ARTICULAR; INTRALESIONAL; INTRAMUSCULAR; INTRAVENOUS; SOFT TISSUE PRN
Status: DISCONTINUED | OUTPATIENT
Start: 2022-10-11 | End: 2022-10-11 | Stop reason: SDUPTHER

## 2022-10-11 RX ORDER — FENTANYL CITRATE 50 UG/ML
100 INJECTION, SOLUTION INTRAMUSCULAR; INTRAVENOUS
Status: DISCONTINUED | OUTPATIENT
Start: 2022-10-11 | End: 2022-10-11 | Stop reason: HOSPADM

## 2022-10-11 RX ORDER — HYDROMORPHONE HYDROCHLORIDE 2 MG/ML
0.5 INJECTION, SOLUTION INTRAMUSCULAR; INTRAVENOUS; SUBCUTANEOUS EVERY 5 MIN PRN
Status: DISCONTINUED | OUTPATIENT
Start: 2022-10-11 | End: 2022-10-11 | Stop reason: HOSPADM

## 2022-10-11 RX ORDER — SODIUM CHLORIDE 9 MG/ML
INJECTION, SOLUTION INTRAVENOUS PRN
Status: DISCONTINUED | OUTPATIENT
Start: 2022-10-11 | End: 2022-10-11 | Stop reason: HOSPADM

## 2022-10-11 RX ORDER — OXYCODONE HYDROCHLORIDE 5 MG/1
5 TABLET ORAL EVERY 6 HOURS PRN
Qty: 20 TABLET | Refills: 0 | Status: SHIPPED | OUTPATIENT
Start: 2022-10-11 | End: 2022-10-16

## 2022-10-11 RX ORDER — BUPIVACAINE HYDROCHLORIDE 5 MG/ML
INJECTION, SOLUTION EPIDURAL; INTRACAUDAL PRN
Status: DISCONTINUED | OUTPATIENT
Start: 2022-10-11 | End: 2022-10-11 | Stop reason: HOSPADM

## 2022-10-11 RX ORDER — MIDAZOLAM HYDROCHLORIDE 2 MG/2ML
2 INJECTION, SOLUTION INTRAMUSCULAR; INTRAVENOUS
Status: DISCONTINUED | OUTPATIENT
Start: 2022-10-11 | End: 2022-10-11 | Stop reason: HOSPADM

## 2022-10-11 RX ORDER — PROCHLORPERAZINE EDISYLATE 5 MG/ML
5 INJECTION INTRAMUSCULAR; INTRAVENOUS
Status: DISCONTINUED | OUTPATIENT
Start: 2022-10-11 | End: 2022-10-11 | Stop reason: HOSPADM

## 2022-10-11 RX ORDER — CEPHALEXIN 500 MG/1
500 CAPSULE ORAL 4 TIMES DAILY
Qty: 12 CAPSULE | Refills: 0 | Status: SHIPPED | OUTPATIENT
Start: 2022-10-11

## 2022-10-11 RX ORDER — LIDOCAINE HYDROCHLORIDE 10 MG/ML
1 INJECTION, SOLUTION INFILTRATION; PERINEURAL
Status: DISCONTINUED | OUTPATIENT
Start: 2022-10-11 | End: 2022-10-11 | Stop reason: HOSPADM

## 2022-10-11 RX ADMIN — LIDOCAINE HYDROCHLORIDE 100 MG: 20 INJECTION, SOLUTION EPIDURAL; INFILTRATION; INTRACAUDAL; PERINEURAL at 08:13

## 2022-10-11 RX ADMIN — PROPOFOL 70 MG: 10 INJECTION, EMULSION INTRAVENOUS at 08:21

## 2022-10-11 RX ADMIN — ONDANSETRON 4 MG: 2 INJECTION INTRAMUSCULAR; INTRAVENOUS at 08:55

## 2022-10-11 RX ADMIN — PROPOFOL 200 MG: 10 INJECTION, EMULSION INTRAVENOUS at 08:14

## 2022-10-11 RX ADMIN — Medication 2000 MG: at 08:07

## 2022-10-11 RX ADMIN — ACETAMINOPHEN 1000 MG: 500 TABLET, FILM COATED ORAL at 07:30

## 2022-10-11 RX ADMIN — DEXAMETHASONE SODIUM PHOSPHATE 4 MG: 4 INJECTION, SOLUTION INTRAMUSCULAR; INTRAVENOUS at 08:51

## 2022-10-11 RX ADMIN — SODIUM CHLORIDE, POTASSIUM CHLORIDE, SODIUM LACTATE AND CALCIUM CHLORIDE: 600; 310; 30; 20 INJECTION, SOLUTION INTRAVENOUS at 07:32

## 2022-10-11 ASSESSMENT — PAIN SCALES - GENERAL: PAINLEVEL_OUTOF10: 0

## 2022-10-11 ASSESSMENT — PAIN - FUNCTIONAL ASSESSMENT: PAIN_FUNCTIONAL_ASSESSMENT: 0-10

## 2022-10-11 NOTE — BRIEF OP NOTE
Brief Postoperative Note      Patient: Ashley Lobato  YOB: 1950  MRN: [id number]    Date of Procedure: 10/11/2022    Pre-Op Diagnosis: Hallux valgus, left [M20.12]    Post-Op Diagnosis: Same       Procedure(s):  left double level minimal invasive BUNIONECTOMY    Surgeon(s):  Trini Ayers MD    Assistant:  * No surgical staff found *    Anesthesia: General    Estimated Blood Loss (mL): Minimal    Complications: None    Specimens:   * No specimens in log *    Implants:  Implant Name Type Inv.  Item Serial No.  Lot No. LRB No. Used Action   SCREW BNE L60MM DIA4MM PROSTEP RAJI - [id number]  SCREW BNE L60MM DIA4MM PROSTEP RAJI  [facility] [id number] Left 1 Implanted   SCREW BNE L50MM DIA4MM TI ALLY [facility] FULL THRD [facility] [id number]  SCREW BNE L50MM [facility]IA4MM [facility] [employer] [id number] Left 1 Implanted   SCREW BNE L26MM DIA3MM NONBIOABSORBABLE HV - [id number]  SCREW BNE L26MM DIA3MM NONBIOABSORBABLE [facility] DNA SEQ- [id number] Left 1 Implanted         Drains: * No LDAs found *    Findings:     Electronically signed by Emmanuel Nava MD on 10/11/2022 at 9:00 AM

## 2022-10-11 NOTE — ANESTHESIA PRE PROCEDURE
Department of Anesthesiology  Preprocedure Note       Name:  Tucker Moss   Age:  81 y.o.  :  1950                                          MRN:  [id number]         Date:  10/11/2022      Surgeon: Jin Sinclair):  Kimberly Christopher MD    Procedure: Procedure(s):  left double level minimal invasive BUNIONECTOMY    Medications prior to admission:   Prior to Admission medications    Medication Sig Start Date End Date Taking? Authorizing Provider   Multiple Vitamins-Minerals (MULTIVITAMIN ADULTS PO) Take 1 tablet by mouth daily    Historical Provider, MD   rosuvastatin (CRESTOR) 20 MG tablet Take 10 mg by mouth daily  22   Historical Provider, MD   aspirin 81 MG EC tablet Take 81 mg by mouth daily    Historical Provider, MD   ibuprofen (ADVIL;MOTRIN) 200 MG tablet Take 200 mg by mouth every 6 hours as needed for Pain    Historical Provider, MD       Current medications:    No current facility-administered medications for this visit. No current outpatient medications on file.      Facility-Administered Medications Ordered in Other Visits   Medication Dose Route Frequency Provider Last Rate Last Admin    ceFAZolin (ANCEF) 2000 mg in sterile water 20 mL IV syringe  2,000 mg IntraVENous On Call to Katie Molina, APRN - CNP        lactated ringers infusion   IntraVENous Continuous Kentrell Arrington, APRN - CNP        sodium chloride flush 0.9 % injection 5-40 mL  5-40 mL IntraVENous 2 times per day Kentrell Arrington, APRN - CNP        sodium chloride flush 0.9 % injection 5-40 mL  5-40 mL IntraVENous PRN Kentrell Arrington, APRN - CNP        0.9 % sodium chloride infusion   IntraVENous PRN Kentrell Arrington, APRN - CNP        lidocaine 1 % injection 1 mL  1 mL IntraDERmal Once PRN Kenton Pinedo MD        acetaminophen (TYLENOL) tablet 1,000 mg  1,000 mg Oral Once Kenton Pinedo MD        fentaNYL (SUBLIMAZE) injection 100 mcg  100 mcg IntraVENous Once PRN Kenton Pinedo MD        lactated ringers infusion   IntraVENous Continuous Hugh Estrella MD        sodium chloride flush 0.9 % injection 5-40 mL  5-40 mL IntraVENous 2 times per day Hugh Estrella MD        sodium chloride flush 0.9 % injection 5-40 mL  5-40 mL IntraVENous PRN Hugh Estrella MD        0.9 % sodium chloride infusion   IntraVENous PRN Hugh Estrella MD        midazolam PF (VERSED) injection 2 mg  2 mg IntraVENous Once PRN Hugh Estrella MD           Allergies: Allergies   Allergen Reactions    Codeine Itching    Hydrocodone-Acetaminophen Itching    Other Itching     \"opiods\"       Problem List:    Patient Active Problem List   Diagnosis Code    Synovial cyst of lumbar facet joint M71.38    Neurogenic claudication due to lumbar spinal stenosis M48.062    Hallux valgus, left M20.12       Past Medical History:        Diagnosis Date    Arthritis     Hyperlipidemia     Osteoarthritis        Past Surgical History:        Procedure Laterality Date    COLONOSCOPY      ELBOW ARTHROSCOPY Right     FINGER SURGERY Left     thumb    LUMBAR SPINE SURGERY Left 5/31/2022    LEFT L4 L5 JOAQUIN LAMINECTOMY performed by Brandee Novka MD at [facility] MA[facility] OR    SHOULDER ARTHROSCOPY Left     UVULECTOMY         Social History:    Social History     Tobacco Use    Smoking status: Former    Smokeless tobacco: Never    Tobacco comments:     quit 1994   Substance Use Topics    Alcohol use: Yes     Alcohol/week: 10.0 standard drinks     Types: 10 Cans of beer per week     Comment: weekly                                Counseling given: Not Answered  Tobacco comments: quit 1994      Vital Signs (Current): There were no vitals filed for this visit.                                            BP Readings from Last 3 Encounters:   10/11/22 (!) 141/81   05/31/22 (!) 153/83   05/24/22 (!) 142/88       NPO Status:                                                                                 BMI:   Wt Readings and agrees with Preprocedure Goldy Handley MD   10/11/2022

## 2022-10-11 NOTE — DISCHARGE INSTRUCTIONS
INSTRUCTIONS FOLLOWING FOOT SURGERY    ACTIVITY  1.)  Elevate foot. No Ice.    2.)  Protected partial weight bearing on the heel only as tolerated in post op shoe after full sensation returns. Blood clot prevention:  As instructed by Dr Franklin Blancas: Take 81 mg aspirin twice daily if okay with your medical doctor and you have no GI ulcer. Get up and out of bed frequently. While in bed move the legs as much as possible. DRESSING CARE Keep dry and in place until follow up appointment. Cover with cast bag or plastic bag when showering. Let the office know if dressing gets saturated with water. Don't put anything into the splint to relieve itching etc.     CALL YOUR DOCTOR IF YOU HAVE  Excessive bleeding that does not stop after holding mild pressure over the area. Temperature of 101 degrees or above. Redness, excessive swelling or bruising, and/or green or yellow, smelly discharge from incision. Loss of sensation - cold, white or blue toes. DIET  Day of Surgery: Clear liquids until no nausea or vomiting; then light, bland diet (Baked chicken, plain rice, grits, scrambled egg, toast). Nothing Greasy, fried or spicy today  Advance to regular diet on second day, unless your doctor orders otherwise. PAIN  Take pain medications as directed by your doctor. Call your doctor if pain is NOT relieved by medication. MEDICATION INTERACTION:  During your procedure you potentially received a medication or medications which may reduce the effectiveness of oral contraceptives. Please consider other forms of contraception for 1 month following your procedure if you are currently using oral contraceptives as your primary form of birth control.  In addition to this, we recommend continuing your oral contraceptive as prescribed, unless otherwise instructed by your physician, during this time    After general anesthesia or intravenous sedation, for 24 hours or while taking prescription Narcotics:  Limit your activities  A responsible adult needs to be with you for the next 24 hours  Do not drive and operate hazardous machinery  Do not make important personal or business decisions  Do not drink alcoholic beverages  If you have not urinated within 8 hours after discharge, and you are experiencing discomfort from urinary retention, please go to the nearest ED. If you have sleep apnea and have a CPAP machine, please use it for all naps and sleeping. Please use caution when taking narcotics and any of your home medications that may cause drowsiness. *  Please give a list of your current medications to your Primary Care Provider. *  Please update this list whenever your medications are discontinued, doses are      changed, or new medications (including over-the-counter products) are added. *  Please carry medication information at all times in case of emergency situations. These are general instructions for a healthy lifestyle:  No smoking/ No tobacco products/ Avoid exposure to second hand smoke  Surgeon General's Warning:  Quitting smoking now greatly reduces serious risk to your health. Obesity, smoking, and sedentary lifestyle greatly increases your risk for illness  A healthy diet, regular physical exercise & weight monitoring are important for maintaining a healthy lifestyle    You may be retaining fluid if you have a history of heart failure or if you experience any of the following symptoms:  Weight gain of 3 pounds or more overnight or 5 pounds in a week, increased swelling in our hands or feet or shortness of breath while lying flat in bed. Please call your doctor as soon as you notice any of these symptoms; do not wait until your next office visit. Learning About How to Use Crutches  Your Care Instructions  Crutches can help you walk when you have an injured hip, leg, knee, ankle, or foot. Your doctor will tell you how much weight--if any--you can put on your leg. Be sure your crutches fit you.  When you stand up in your normal posture, there should be space for two or three fingers between the top of the crutch and your armpit. When you let your hands hang down, the hand  should be at your wrists. When you put your hands on the hand , your elbows should be slightly bent. To stay safe when using crutches:  Look straight ahead, not down at your feet. Clear away small rugs, cords, or anything else that could cause you to trip, slip, or fall. Be very careful around pets and small children. They can get in your path when you least expect it. Be sure the rubber tips on your crutches are clean and in good condition to help prevent slipping. Avoid slick conditions, such as wet floors and snowy or icy driveways. In bad weather, be especially careful on curbs and steps. How to use crutches  Getting ready to walk    [facility] your elbows slightly. Press the padded top parts of the crutches against your sides, under your armpits. If you have been told not to put any weight on your injured leg, keep that leg bent and off the ground. Walking with crutches    Put both crutches about 12 inches in front of you. Put your weight on the handgrips, not on the pads under your arms. (Constant pressure against your underarms can cause numbness.) Swing your body forward. (If you have been told not to put any weight on your injured leg, keep that leg bent and off the ground.)  To complete the step, put your weight on the strong leg. Move your crutches about 12 inches in front of you, and start the next step. When you're confident using the crutches, you can move the crutches and your injured leg at the same time. Then push straight down on the crutches as you step past the crutches with your strong leg, as you would in normal walking. Take small steps. Use ramps and elevators when you can. Sitting down    To sit, back up to the chair. Use one hand to hold both crutches by the handgrips, beside your injured leg.  With the other hand, hold onto the seat and slowly lower yourself onto the chair. Lay the crutches on the ground near your chair. If you prop them up, they may fall over. Getting up from a chair    To get up from a chair,  the crutches and put them in one hand beside your injured leg. Put your weight on the handgrips of the crutches and on your strong leg to stand up. Walking up stairs    To go up stairs, step up with your strong leg and then bring the crutches and your injured leg to the upper step. For stairs that have handrails: Put both crutches under the arm opposite the handrail. Use the hand opposite the handrail to hold both crutches by the handgrips. Hold onto the handrail as you go up. Put your strong leg on the step first when you go up. Walking down stairs    To go down stairs, put your crutches and injured leg on the lower step. Bring your strong leg to the lower step. This saying may help you remember: \"Up with the good, down with the bad. \"  For stairs that have handrails: Put both crutches under the arm opposite the handrail. Use the hand opposite the handrail to hold both crutches by the handgrips. Hold onto the handrail as you go down. Follow the same process you use for stairs: Lead with your crutches and injured leg on the way down.

## 2022-10-11 NOTE — PERIOP NOTE
Discharge instructions discussed with wife at bedside, no questions or concerns at this time. Hard copy of instructions given to wife, prescriptions escribed, pt given post op shoe.

## 2022-10-11 NOTE — H&P
Outpatient Surgery History and Physical:  Giulia Chacon was seen and examined. CHIEF COMPLAINT:   left foot. PE:   BP (!) 141/81   Pulse 61   Temp 97.6 °F (36.4 °C) (Oral)   Resp 18   Ht 5' 11\" (1.803 m)   Wt 195 lb (88.5 kg)   SpO2 97%   BMI 27.20 kg/m²     Heart:   Regular rhythm      Lungs:  Are clear      Past Medical History:    Past Medical History:   Diagnosis Date    Arthritis     Hyperlipidemia     Osteoarthritis        Surgical History:   Past Surgical History:   Procedure Laterality Date    COLONOSCOPY      ELBOW ARTHROSCOPY Right     FINGER SURGERY Left     thumb    LUMBAR SPINE SURGERY Left 5/31/2022    LEFT L4 L5 JOAQUIN LAMINECTOMY performed by Yvonne Guan MD at [facility] MAIN OR    SHOULDER ARTHROSCOPY Left     UVULECTOMY         Social History: Patient  reports that he has quit smoking. He has never used smokeless tobacco. He reports current alcohol use of about 10.0 standard drinks per week. He reports that he does not currently use drugs. Family History: History reviewed. No pertinent family history. Allergies: Reviewed per EMR  Codeine, Hydrocodone-acetaminophen, and Other    Medications:    Prior to Admission medications    Medication Sig Start Date End Date Taking? Authorizing Provider   Multiple Vitamins-Minerals (MULTIVITAMIN ADULTS PO) Take 1 tablet by mouth daily    Historical Provider, MD   rosuvastatin (CRESTOR) 20 MG tablet Take 10 mg by mouth daily  1/20/22   Historical Provider, MD   aspirin 81 MG EC tablet Take 81 mg by mouth daily    Historical Provider, MD   ibuprofen (ADVIL;MOTRIN) 200 MG tablet Take 200 mg by mouth every 6 hours as needed for Pain    Historical Provider, MD       The surgery is planned for the Left double level minimal invasive bunionectomy. History and physical has been reviewed. The patient has been examined.  There have been no significant clinical changes since the completion of the originally dated History and Physical.  Patient identified by surgeon; surgical site was confirmed by patient and surgeon. The patient is here today for outpatient surgery. I have examined the patient, no changes are noted in the patient's medical status. Necessity for the procedure/care is still present and the history and physical above is current. See the office notes for the full long term history of the problem. Please see the recent office notes for the musculoskeletal examination.     Signed By: SANDRA Calvert CNP     October 11, 2022 7:29 AM

## 2022-10-11 NOTE — ANESTHESIA PROCEDURE NOTES
Airway  Date/Time: 10/11/2022 8:15 AM  Urgency: elective    Airway not difficult    General Information and Staff    Patient location during procedure: OR  Resident/CRNA: SANDRA Grijalva - CRNA  Performed: resident/CRNA     Indications and Patient Condition  Indications for airway management: anesthesia  Spontaneous Ventilation: absent  Sedation level: deep  Preoxygenated: yes  Patient position: sniffing  MILS maintained throughout  Mask difficulty assessment: vent by bag mask    Final Airway Details  Final airway type: supraglottic airway      Successful airway: Size 4.5     Number of attempts at approach: 1

## 2022-10-11 NOTE — OP NOTE
site and prepped and draped in a standard sterile fashion using ChloraPrep solution. A small incision was made at the first tarsometatarsal joint. Blunt dissection was carried down to the bone. A guidewire for the cannulated screw system was placed inside the intramedullary canal of the first metatarsal.  A separate small incision was then made distally. Blunt dissection was carried down to the bone. A minimal invasive bur was introduced and a transverse osteotomy was performed with the capital fragment shifted approximately 50% laterally and secured using 2 guidewires. Lag screws were placed over both guidewires with good purchase noted. An Kenny osteotomy was performed percutaneously with a bur and secured using headless screw as well. The wounds were irrigated and closed using Monocryl and nylon sutures. A sterile dressing was then applied. Anesthesia was discontinued. The patient was transferred back to recovery bed. He was taken to recovery in satisfactory condition. Appear to tolerate the procedure well. There were no apparent surgical or anesthetic complications. All needle and sponge counts were correct. A sterile dressing was then applied to the leg and Hardole/Post op sandal.  They were awoken from anesthesia and returned to the PACU without difficulty.     Post Operative Plan:   1- WB status: As tolerated   2- Immobilization/assistive devices: walker  3- DVT px: No VTE Prophylaxis Needed

## 2022-10-11 NOTE — ANESTHESIA POSTPROCEDURE EVALUATION
Department of Anesthesiology  Postprocedure Note    Patient: Iam Harp  MRN: [id number]  YOB: 1950  Date of evaluation: 10/11/2022      Procedure Summary     Date: 10/11/22 Room / Location:  OP OR 03 / [facility]    Anesthesia Start: 0808 Anesthesia Stop: 0908    Procedure: left double level minimal invasive BUNIONECTOMY (Left: Foot) Diagnosis:       Hallux valgus, left      (Hallux valgus, left [M20.12])    Surgeons: David Robb MD Responsible Provider: Cynthia Lockwood MD    Anesthesia Type: general ASA Status: 2          Anesthesia Type: No value filed. Amy Phase I: Amy Score: 10    Amy Phase II: Amy Score: 10      Anesthesia Post Evaluation    Patient location during evaluation: PACU  Patient participation: complete - patient participated  Level of consciousness: awake and alert  Airway patency: patent  Nausea: well controlled. Complications: no  Cardiovascular status: acceptable.   Respiratory status: acceptable  Hydration status: stable

## 2022-10-21 ENCOUNTER — TELEPHONE (OUTPATIENT)
Dept: ORTHOPEDIC SURGERY | Age: 72
End: 2022-10-21

## 2022-10-21 NOTE — TELEPHONE ENCOUNTER
Spoke with pt he states he is trying to elevate his foot but is having swelling and thinks that the coban is too tight. Told pt to remove the coban and keep all the white padding in place then use ACE wrap to wrap over the white bandage. If he needs to come into office next week to let me know. He understands and also aware to elevate his foot above his heart.

## 2022-10-31 ENCOUNTER — OFFICE VISIT (OUTPATIENT)
Dept: ORTHOPEDIC SURGERY | Age: 72
End: 2022-10-31

## 2022-10-31 DIAGNOSIS — M20.12 HALLUX VALGUS OF LEFT FOOT: Primary | ICD-10-CM

## 2022-10-31 PROCEDURE — 99024 POSTOP FOLLOW-UP VISIT: CPT | Performed by: NURSE PRACTITIONER

## 2022-10-31 NOTE — PROGRESS NOTES
Name: Urmila Villafana  YOB: 1950  Gender: male  MRN: [id number]    Procedure Performed: Left double level minimal invasive bunionectomy      Date of Procedure: 10/11/2022      Subjective: Patient notes that he has done well postoperatively. He notes that his pain is been manageable and that this was a harder experience than he expected. Physical Examination: Patient's incisions appear to be healing well and show no signs of infection. His skin is warm and dry and he is palpable pulses and intact sensation to the foot. There is mild discoloration along the dorsal aspect of the foot. Range of motion to the great toe was performed as well as the first MTP and TMT joints without difficulty, mild stiffness, but no pain experienced by the patient. He has expected swelling throughout the dorsal aspect of the foot and great toe. Thus far he has great surgical alignment and correction of the hallux valgus deformity. Imaging:   No imaging reviewed          Assessment:   Status post left double level MIS bunionectomy. We discussed progression of care today as well as expectations until his next follow-up visit. His questions and concerns were addressed, he verbalized understanding of today's conversation. Plan:   3 This is stable chronic illness/condition  Treatment at this time:  Sutures were removed today, Steri-Strips were placed. Patient will continue to wear postop shoe and continue to progressively bear weight on the affected extremity as he feels he can tolerate and swelling allows. We will add a bunion splint to this that he will wear all the time except when getting the foot wet until his next follow-up visit. He was encouraged to continue elevating the affected extremity for swelling. He has resumed working and notes he needs no restrictions.   He may now get the affected foot wet including showering and soaking as needed, recommendations Epsom salts was given to help with additional incisional healing as well as swelling purposes. He will follow-up in 2 weeks or sooner if needed with x-ray. Studies ordered:  Foot XR needed @ Next Visit    Weight-bearing status: WBAT in Boot/hardsole shoe        Return to work/work restrictions: none  No medications given

## 2022-11-14 ENCOUNTER — OFFICE VISIT (OUTPATIENT)
Dept: ORTHOPEDIC SURGERY | Age: 72
End: 2022-11-14

## 2022-11-14 DIAGNOSIS — M20.12 HALLUX VALGUS OF LEFT FOOT: Primary | ICD-10-CM

## 2022-11-14 PROCEDURE — 99024 POSTOP FOLLOW-UP VISIT: CPT | Performed by: NURSE PRACTITIONER

## 2022-11-14 NOTE — PROGRESS NOTES
Name: Giulia Chacon  YOB: 1950  Gender: male  MRN: [id number]    Procedure Performed: Left double level minimal invasive bunionectomy        Date of Procedure: 10/11/2022      Subjective: Patient notes that he still has some numbness and tingling to the foot especially at night. And he is concerned that he cannot get his foot into a regular shoe at this point. Patient reports to the visit today wearing a croc shoe versus the postop shoe as he notes that extended periods of time of wearing cause leg pain. Physical Examination: Incisions are still continuing to heal well and show no signs of infection. They are currently scabbed. He has great surgical alignment correction of the hallux valgus deformity. He has palpable pulses and intact sensation of the foot. He has no pain to the plantar aspect of the first metatarsal.  He is able to wiggle his toes without difficulty or pain. Range of motion to the great toe did show stiffness however there was no pain. Imaging:   I independently interpreted XR taken today  Left foot XR: AP, Lateral, Oblique views     ICD-10-CM    1. Hallux valgus of left foot  M20.12 XR FOOT LEFT (MIN 3 VIEWS)         Report: AP, lateral, oblique x-ray of the left foot demonstrates hallux valgus correction without hardware failure    Impression: Hallux valgus correction without hardware failure   SANDRA Rocha - FAUSTINA           Assessment:   Status post left double level MIS bunionectomy. We discussed progression of care today including the options of physical therapy versus at home exercises at this time. The patient does feel like there is a decrease in mobility of the operative foot at this time. Questions and concerns were addressed, x-rays were discussed, patient verbalized understanding of today's conversation.     Plan:   3 This is stable chronic illness/condition  Treatment at this time:  Patient is now approved to wear what ever shoe he can tolerate and the swelling allows him to fit into. He was encouraged to continue elevating affected extremity as needed for swelling. He may continue to soak the affected extremity with Epsom salts to help with additional incisional healing. He may resume physical activities at this time excluding running and jumping or other high impact exercises. He may use compression socks as he needs to help with swelling. He was started diligent at home exercise program to help increase with mobility of the toes. He will follow-up in approximately 6 weeks or sooner if needed with x-ray. Studies ordered:  Foot XR needed @ Next Visit    Weight-bearing status: WBAT        Return to work/work restrictions: none  No medications given

## 2023-01-09 ENCOUNTER — OFFICE VISIT (OUTPATIENT)
Dept: ORTHOPEDIC SURGERY | Age: 73
End: 2023-01-09

## 2023-01-09 DIAGNOSIS — M20.12 HALLUX VALGUS OF LEFT FOOT: Primary | ICD-10-CM

## 2023-01-09 PROCEDURE — 99024 POSTOP FOLLOW-UP VISIT: CPT | Performed by: NURSE PRACTITIONER

## 2023-01-09 NOTE — PROGRESS NOTES
Name: Kameron Peters  YOB: 1950  Gender: male  MRN: [id number]    Procedure Performed: Left double level minimal invasive bunionectomy        Date of Procedure: 10/11/2022    Subjective: Patient notes that he feels like he should have progressed further than where he is this far postoperatively. He notes that he still has swelling as well as pain on occasion. Physical Examination: Incision is well-healed there are no signs of infection. He has palpable pulses and intact sensation to the foot. He still has mild swelling to the dorsal aspect of the foot and along the incisional lines. He has great surgical alignment and correction of the hallux valgus deformity. He is able to wiggle the lesser toes without difficulty or pain. Range of motion to the great toe was performed at the MTP as well as the TMT joints without stiffness or pain. Imaging:   I independently interpreted XR taken today  Left foot XR: AP, Lateral, Oblique views     ICD-10-CM    1. Hallux valgus of left foot  M20.12 XR FOOT LEFT (MIN 3 VIEWS)         Report: AP, lateral, oblique x-ray of the left foot demonstrates hallux valgus correction without hardware failure    Impression: Hallux valgus correction without hardware failure   Sophie Dobbins, SANDRA - CNP           Assessment:   Status post left double level MIS bunionectomy. We discussed potential reasons for the patient still experiencing discomfort at this point postoperatively. He is not willing at this time to seek any help from physical therapy. This was my recommendation today. We also discussed the length of time it takes for full recovery after foot and ankle surgery.       Plan:   3 This is stable chronic illness/condition  Treatment at this time:  Patient may continue his current treatment plan at this time including wearing comfortable shoes as he can tolerate, elevation as needed for swelling as well as icing,, there are no longer any restrictions on his levels of physical activity at this time, he will continue a diligent home exercise program to increase strength mobility of the affected foot, he will think about physical therapy and reach out if he is willing to give this a try. He will follow-up in 3 months or sooner if needed with x-ray. Studies ordered:  Foot XR needed @ Next Visit    Weight-bearing status: WBAT        Return to work/work restrictions: none  No medications given

## 2023-06-20 ENCOUNTER — TELEPHONE (OUTPATIENT)
Dept: ORTHOPEDIC SURGERY | Age: 73
End: 2023-06-20

## 2023-06-20 NOTE — TELEPHONE ENCOUNTER
He had surgery in Oct. He is still experiencing a great deal of pain and he wants to know if this is normal. He is asking for a return call.

## 2023-06-21 NOTE — TELEPHONE ENCOUNTER
Called and spoke with pt he wanted to set up an appt 7/3 @ 9:40 aware [facility] office.  He can discuss treatment options with Dr Jean-Claude Mendez and at last visit was told possible [facility]

## 2023-07-03 ENCOUNTER — OFFICE VISIT (OUTPATIENT)
Dept: ORTHOPEDIC SURGERY | Age: 73
End: 2023-07-03

## 2023-07-03 DIAGNOSIS — M20.12 HALLUX VALGUS OF LEFT FOOT: Primary | ICD-10-CM

## 2023-07-03 PROCEDURE — 99024 POSTOP FOLLOW-UP VISIT: CPT | Performed by: ORTHOPAEDIC SURGERY

## 2024-01-15 ENCOUNTER — OFFICE VISIT (OUTPATIENT)
Dept: ORTHOPEDIC SURGERY | Age: 74
End: 2024-01-15
Payer: COMMERCIAL

## 2024-01-15 DIAGNOSIS — M19.079 ARTHRITIS OF MIDFOOT: ICD-10-CM

## 2024-01-15 DIAGNOSIS — M20.12 HALLUX VALGUS OF LEFT FOOT: Primary | ICD-10-CM

## 2024-01-15 PROCEDURE — 99214 OFFICE O/P EST MOD 30 MIN: CPT | Performed by: ORTHOPAEDIC SURGERY

## 2024-01-15 PROCEDURE — 1123F ACP DISCUSS/DSCN MKR DOCD: CPT | Performed by: ORTHOPAEDIC SURGERY

## 2024-01-15 RX ORDER — MELOXICAM 15 MG/1
15 TABLET ORAL DAILY
Qty: 30 TABLET | Refills: 3 | Status: SHIPPED | OUTPATIENT
Start: 2024-01-15

## 2024-01-15 NOTE — PROGRESS NOTES
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 male who presents with Follow-up (10/11/2022  Left double level minimal invasive bunionectomy xrays obtained in office today due to increased pain/)  .  This patient presents back the office about a year and a half out from a left minimal base of bunionectomy.  He predominately 7 pain over the second TMT joint at this point no dose of some mild discomfort over the bunion as well.    History was obtained by Patient     ROS/Meds/PSH/PMH/FH/SH: I personally reviewed the patients standard intake form.  Below are the pertinents    Tobacco:  reports that he has quit smoking. He has never used smokeless tobacco.  Diabetes: None      Physical Examination:    Javier of the left lower extremity shows well-healed surgical incisions good alignment and good range of motion of the first MTP joint.  There is no pain over the hardware noted.  He does have tenderness to palpation at the second tarsometatarsal joint.  He has palpable pulses and intact sensation.    Imaging:   Interpretation of imaging  Foot XR: AP, Lateral, Oblique views     ICD-10-CM    1. Hallux valgus of left foot  M20.12 XR FOOT LEFT (MIN 3 VIEWS)      2. Arthritis of midfoot  M19.079          Report: AP, lateral, oblique x-ray of the foot demonstrates healed osteotomies with second TMT joint arthritis    Impression: Will osteotomies with second TMT joint arthritis   CARLOS NEWSOME III, MD           Assessment:   Left healed osteotomies with second TMT joint arthritis    Treatment Plan:   4 This is a chronic illness/condition with exacerbation and progression  Treatment at this time: Prescription Drug Management  Studies

## 2024-04-08 ENCOUNTER — OFFICE VISIT (OUTPATIENT)
Dept: ORTHOPEDIC SURGERY | Age: 74
End: 2024-04-08
Payer: COMMERCIAL

## 2024-04-08 VITALS — WEIGHT: 200 LBS | HEIGHT: 71 IN | BODY MASS INDEX: 28 KG/M2

## 2024-04-08 DIAGNOSIS — M47.816 LUMBAR FACET ARTHROPATHY: ICD-10-CM

## 2024-04-08 DIAGNOSIS — M43.16 SPONDYLOLISTHESIS, LUMBAR REGION: Primary | ICD-10-CM

## 2024-04-08 PROCEDURE — 1123F ACP DISCUSS/DSCN MKR DOCD: CPT | Performed by: PHYSICIAN ASSISTANT

## 2024-04-08 PROCEDURE — 99204 OFFICE O/P NEW MOD 45 MIN: CPT | Performed by: PHYSICIAN ASSISTANT

## 2024-04-08 NOTE — PROGRESS NOTES
Tuan Mejia MD at [facility] MAIN OR    SHOULDER ARTHROSCOPY Left     UVULECTOMY       Family History: No family history on file.   Social History:   Social History     Tobacco Use    Smoking status: Former    Smokeless tobacco: Never    Tobacco comments:     quit 1994   Substance Use Topics    Alcohol use: Yes     Alcohol/week: 10.0 standard drinks of alcohol     Types: 10 Cans of beer per week     Comment: weekly       ALLERGIES:   Allergies   Allergen Reactions    Codeine Itching    Hydrocodone-Acetaminophen Itching    Other Itching     \"opiods\"        Patient Medications    Current Outpatient Medications   Medication Sig Dispense Refill    meloxicam (MOBIC) 15 MG tablet Take 1 tablet by mouth daily 30 tablet 3    cephALEXin (KEFLEX) 500 MG capsule Take 1 capsule by mouth 4 times daily 12 capsule 0    Multiple Vitamins-Minerals (MULTIVITAMIN ADULTS PO) Take 1 tablet by mouth daily      rosuvastatin (CRESTOR) 20 MG tablet Take 10 mg by mouth daily       aspirin 81 MG EC tablet Take 81 mg by mouth daily       No current facility-administered medications for this visit.                   Review of Systems:  As per HPI.  Pertinent positives and negatives are addressed with the patient, particularly those related to musculoskeletal concerns.   Other non-emergent concerns were referred back to the primary care physician.      PHYSICAL EXAMINATION:     The patient appears their stated age and they are in no distress.  Ht 1.803 m (5' 11\")   Wt 90.7 kg (200 lb)   BMI 27.89 kg/m²         Neuro:   Reflexes  Knees: Diminished  Ankles: Diminished        Sensory    Sensation to light touch intact L3-S1      Motor    Hip Flexion: grossly normal    Knee Extension:  grossly normal    Ankle Dorsiflexion: grossly normal    Great Toe Exension:  grossly normal       Point tenderness: Minimal    Gait: No appreciable limp              IMAGING:     MRI Result (most recent):  MRI LUMBAR SPINE WO CONTRAST

## 2024-04-15 ENCOUNTER — OFFICE VISIT (OUTPATIENT)
Dept: ORTHOPEDIC SURGERY | Age: 74
End: 2024-04-15
Payer: COMMERCIAL

## 2024-04-15 DIAGNOSIS — M47.816 LUMBAR SPONDYLOSIS: Primary | ICD-10-CM

## 2024-04-15 PROCEDURE — 64494 INJ PARAVERT F JNT L/S 2 LEV: CPT | Performed by: PHYSICAL MEDICINE & REHABILITATION

## 2024-04-15 PROCEDURE — 64493 INJ PARAVERT F JNT L/S 1 LEV: CPT | Performed by: PHYSICAL MEDICINE & REHABILITATION

## 2024-04-15 RX ORDER — TRIAMCINOLONE ACETONIDE 40 MG/ML
200 INJECTION, SUSPENSION INTRA-ARTICULAR; INTRAMUSCULAR ONCE
Status: COMPLETED | OUTPATIENT
Start: 2024-04-15 | End: 2024-04-15

## 2024-04-15 RX ADMIN — TRIAMCINOLONE ACETONIDE 200 MG: 40 INJECTION, SUSPENSION INTRA-ARTICULAR; INTRAMUSCULAR at 14:45

## 2024-04-15 NOTE — PROGRESS NOTES
Date: 04/15/24   Name: Kirit Sebastian    Pre-Procedural Diagnosis:    Diagnosis Orders   1. Lumbar spondylosis  FL INJ LUMB/SAC FACET SINGLE LEVEL    XR INJ FACET LUMB SACRAL 2ND LVL    triamcinolone acetonide (KENALOG-40) injection 200 mg          Procedure: Bilateral Facet Joint Injections (2 levels)    Precautions:  LB Precautions spine injections: None.  Patient denies any prior sensitivity to steroid, local anesthetic, contrast dye, iodine or shellfish.    The procedure was discussed at length with the patient and informed consent was signed. The patient was placed in a prone position on the fluoroscopy table and the skin was prepped and draped in a routine sterile fashion. The areas to be injected were each anesthetized with approximately 2-3 cc of 1% Lidocaine. Initially a 22-gauge 3.5 inch spinal needle was carefully advanced under fluoroscopic guidance to the bilateral L4-L5 and L5-S1 facet joint spaces. 1 cc of 0.25% Marcaine and 50 mg of Kenalog were injected through the spinal needle at each site.     Fluoroscopic guidance was used intermittently over a 10-minute period to insure proper needle placement and patient safety. A hard copy of the fluoroscopic  images has been placed in the patient's chart. The patient was monitored after the procedure and discharged home without complication.     A total of 5 cc of Kenalog were administered during this procedure.    Resume Meds:     Pt remains on asa 81 mg.    L SUN DO JR, MD  04/15/24

## 2024-09-04 ENCOUNTER — TELEPHONE (OUTPATIENT)
Dept: ORTHOPEDIC SURGERY | Age: 74
End: 2024-09-04

## 2024-09-05 ENCOUNTER — TELEPHONE (OUTPATIENT)
Dept: ORTHOPEDIC SURGERY | Age: 74
End: 2024-09-05

## 2024-09-09 ENCOUNTER — OFFICE VISIT (OUTPATIENT)
Dept: ORTHOPEDIC SURGERY | Age: 74
End: 2024-09-09
Payer: COMMERCIAL

## 2024-09-09 VITALS — BODY MASS INDEX: 28 KG/M2 | WEIGHT: 200 LBS | HEIGHT: 71 IN

## 2024-09-09 DIAGNOSIS — M47.816 LUMBAR SPONDYLOSIS: Primary | ICD-10-CM

## 2024-09-09 PROCEDURE — 1123F ACP DISCUSS/DSCN MKR DOCD: CPT | Performed by: PHYSICIAN ASSISTANT

## 2024-09-09 PROCEDURE — 99213 OFFICE O/P EST LOW 20 MIN: CPT | Performed by: PHYSICIAN ASSISTANT

## 2024-09-23 ENCOUNTER — TELEPHONE (OUTPATIENT)
Dept: ORTHOPEDIC SURGERY | Age: 74
End: 2024-09-23

## 2024-09-25 ENCOUNTER — TELEPHONE (OUTPATIENT)
Dept: ORTHOPEDIC SURGERY | Age: 74
End: 2024-09-25

## 2024-10-08 ENCOUNTER — TELEPHONE (OUTPATIENT)
Dept: ORTHOPEDIC SURGERY | Age: 74
End: 2024-10-08

## 2024-10-08 DIAGNOSIS — M47.816 LUMBAR SPONDYLOSIS: Primary | ICD-10-CM

## 2024-10-10 ENCOUNTER — OFFICE VISIT (OUTPATIENT)
Dept: ORTHOPEDIC SURGERY | Age: 74
End: 2024-10-10
Payer: COMMERCIAL

## 2024-10-10 DIAGNOSIS — M47.816 LUMBAR FACET ARTHROPATHY: Primary | ICD-10-CM

## 2024-10-10 PROCEDURE — 64494 INJ PARAVERT F JNT L/S 2 LEV: CPT | Performed by: PHYSICAL MEDICINE & REHABILITATION

## 2024-10-10 PROCEDURE — 64493 INJ PARAVERT F JNT L/S 1 LEV: CPT | Performed by: PHYSICAL MEDICINE & REHABILITATION

## 2024-10-10 NOTE — PROGRESS NOTES
Name: Kirit Sebastian  YOB: 1950  Gender: male  MRN: [id number]    Procedure: Bilateral L4 and bilateral L5 and bilateral S1 medial branch nerve blocks    Precautions: None  Patient denies any prior sensitivity to steroid, local anesthetic, iodine or shellfish.    The procedure was discussed at length with him and informed consent was signed and placed in the chart. He was placed in a prone position on the fluoroscopy table and the skin was prepped and draped in a routine sterile fashion. The area to be injected were each anesthetized with 1 ml of 1% Lidocaine.    Next, a 25-gauge 3.5 inch spinal needle was carefully advanced under fluoroscopic guidance toward the position of the right L4 medial branch nerve.  Aspiration was negative. Once proper placement was confirmed, 1 ml of 0.25% Marcaine was injected through the spinal needle. The above procedure was then repeated at the right L5, right S1, left L4, left L5, and left S1 medial branch nerves.    Fluoroscopic guidance was used intermittently over a 10-minute period to insure proper needle placement and his safety. A hard copy of the fluoroscopic images has been placed in his chart. He was monitored for 30 minutes after the procedure and discharged home in a stable fashion with a routine follow up.    Procedural Diagnosis:     ICD-10-CM    1. Lumbar facet arthropathy  M47.816 FL INJ LUMB/SAC FACET SINGLE LEVEL     XR INJ FACET LUMB SACRAL 2ND LVL           SARAH HENSLEY MD

## 2024-10-14 ENCOUNTER — TELEPHONE (OUTPATIENT)
Dept: ORTHOPEDIC SURGERY | Age: 74
End: 2024-10-14

## 2024-10-14 NOTE — TELEPHONE ENCOUNTER
Patient calling to let you know that the injection worked pretty good and he wants to schedule an ablation if possible.

## 2024-10-18 ENCOUNTER — TELEPHONE (OUTPATIENT)
Dept: ORTHOPEDIC SURGERY | Age: 74
End: 2024-10-18

## 2024-10-18 NOTE — TELEPHONE ENCOUNTER
Returned call to patient and he states that the injection in April provided better relief than the injection he had in October. He wanted to know if he should proceed with the RFA. Will send to Dr. Sevilla to review when she returns to the office.

## 2024-10-28 ENCOUNTER — TELEPHONE (OUTPATIENT)
Dept: ORTHOPEDIC SURGERY | Age: 74
End: 2024-10-28

## 2024-10-28 DIAGNOSIS — M47.816 LUMBAR FACET ARTHROPATHY: Primary | ICD-10-CM

## 2024-11-25 ENCOUNTER — OFFICE VISIT (OUTPATIENT)
Dept: ORTHOPEDIC SURGERY | Age: 74
End: 2024-11-25
Payer: COMMERCIAL

## 2024-11-25 DIAGNOSIS — M47.816 LUMBAR FACET ARTHROPATHY: Primary | ICD-10-CM

## 2024-11-25 PROCEDURE — 64636 DESTROY L/S FACET JNT ADDL: CPT | Performed by: PHYSICAL MEDICINE & REHABILITATION

## 2024-11-25 PROCEDURE — 64635 DESTROY LUMB/SAC FACET JNT: CPT | Performed by: PHYSICAL MEDICINE & REHABILITATION

## 2024-11-25 RX ORDER — TRIAMCINOLONE ACETONIDE 40 MG/ML
40 INJECTION, SUSPENSION INTRA-ARTICULAR; INTRAMUSCULAR ONCE
Status: COMPLETED | OUTPATIENT
Start: 2024-11-25 | End: 2024-11-25

## 2024-11-25 RX ADMIN — TRIAMCINOLONE ACETONIDE 40 MG: 40 INJECTION, SUSPENSION INTRA-ARTICULAR; INTRAMUSCULAR at 13:11

## 2024-11-25 NOTE — PROGRESS NOTES
nerve, motor onset was 0.6 volts. Testing for motor and sensory was negative into the lower extremities and groin to 3.0 volts at Left S1 medial branch nerve.    At the level of Left  L5 medial branch nerve, motor onset was 0.5 volts. Testing for motor and sensory was negative into the lower extremities and groin to 3.0 volts at Left L5 medial branch nerve.    At the level of Left L4 medial branch nerve, motor onset was 0.4 volts. Testing for motor and sensory was negative into the lower extremities and groin to 3.0 volts at Left  L4 medial branch nerve.    Following lesioning of all  6  nerves, 0.25 mL of kenalog 40 mg/mL was injected at each level.  He tolerated the procedure well.    There were no complications.  He was stable and ambulatory at discharge.                                                                                                                                                                            Fluoroscopic guidance was used intermittently over a 50-minute period to insure proper needle placement and patient safety.    Procedural Diagnosis: @    ICD-10-CM    1. Lumbar facet arthropathy  M47.816 XR DESTR PARAVERTBRL LUMB/SAC ADD LV W S&I     XR DESTR PARAVERTBRL LUMB/SAC W S&I     triamcinolone acetonide (KENALOG-40) injection 40 mg          SARAH HENSLEY MD

## (undated) DEVICE — ABSORBENT, WATERPROOF, BACTERIA PROOF FILM DRESSING: Brand: OPSITE POST OP 9.5X8.5CM CTN 20

## (undated) DEVICE — SYSTEM SKIN CLSR 22CM DERMBND PRINEO

## (undated) DEVICE — SOLUTION IRRIG 1000ML 09% SOD CHL USP PIC PLAS CONTAINER

## (undated) DEVICE — GLOVE SURG SZ 65 L12IN FNGR THK79MIL GRN LTX FREE

## (undated) DEVICE — BURR: Brand: MICA

## (undated) DEVICE — ZIMMER® STERILE DISPOSABLE TOURNIQUET CUFF WITH PLC, DUAL PORT, SINGLE BLADDER, 18 IN. (46 CM)

## (undated) DEVICE — FOOT & ANKLE SOFT DR WOMACK: Brand: MEDLINE INDUSTRIES, INC.

## (undated) DEVICE — GLOVE SURG SZ 65 CRM LTX FREE POLYISOPRENE POLYMER BEAD ANTI

## (undated) DEVICE — DRILL BIT: Brand: MICA

## (undated) DEVICE — GLOVE SURG SZ 8 L12IN FNGR THK79MIL GRN LTX FREE

## (undated) DEVICE — K-WIRE BLUNT/TROCAR
Type: IMPLANTABLE DEVICE | Site: FOOT | Status: NON-FUNCTIONAL
Removed: 2022-10-11

## (undated) DEVICE — POSTERIOR LAMI VANPLT-LUCAS: Brand: MEDLINE INDUSTRIES, INC.

## (undated) DEVICE — BANDAGE,ELASTIC,ESMARK,STERILE,4"X9',LF: Brand: MEDLINE

## (undated) DEVICE — GOWN,SIRUS,NONRNF,SETINSLV,XL,20/CS: Brand: MEDLINE

## (undated) DEVICE — SUTURE VCRL SZ 1 L27IN ABSRB UD L36MM CP-1 1/2 CIR REV CUT J268H

## (undated) DEVICE — BANDAGE,GAUZE,CONFORMING,2"X75",STRL,LF: Brand: MEDLINE INDUSTRIES, INC.

## (undated) DEVICE — HEX DRIVER: Brand: MICA

## (undated) DEVICE — WEDGE BURR: Brand: MICA

## (undated) DEVICE — SUTURE VCRL SZ 3-0 L27IN ABSRB UD L19MM PS-2 3/8 CIR PRIM J427H

## (undated) DEVICE — SUTURE VCRL SZ 2-0 L27IN ABSRB UD L36MM CP-1 1/2 CIR REV J266H

## (undated) DEVICE — GAUGE DEPTH 150 MM PROSTEP MICA

## (undated) DEVICE — PRECISION THIN, OFFSET (5.5 X 0.38 X 25.0MM)

## (undated) DEVICE — DRESSING PETRO W3XL8IN OIL EMUL N ADH GZ KNIT IMPREG CELOS

## (undated) DEVICE — AGENT HEMOSTATIC SURGIFLOW MATRIX KIT W/THROMBIN